# Patient Record
Sex: FEMALE | Race: WHITE | Employment: FULL TIME | URBAN - METROPOLITAN AREA
[De-identification: names, ages, dates, MRNs, and addresses within clinical notes are randomized per-mention and may not be internally consistent; named-entity substitution may affect disease eponyms.]

---

## 2018-08-17 ENCOUNTER — EMPLOYEE HEALTH (OUTPATIENT)
Dept: OTHER | Facility: HOSPITAL | Age: 33
End: 2018-08-17
Attending: PREVENTIVE MEDICINE
Payer: COMMERCIAL

## 2018-08-17 ENCOUNTER — HOSPITAL ENCOUNTER (OUTPATIENT)
Dept: GENERAL RADIOLOGY | Facility: HOSPITAL | Age: 33
Discharge: HOME | End: 2018-08-17
Attending: PREVENTIVE MEDICINE
Payer: COMMERCIAL

## 2018-08-17 DIAGNOSIS — R76.11 POSITIVE TB TEST: ICD-10-CM

## 2018-08-17 DIAGNOSIS — Z11.1 SCREENING-PULMONARY TB: Primary | ICD-10-CM

## 2018-08-29 ENCOUNTER — LAB ENCOUNTER (OUTPATIENT)
Dept: LAB | Age: 33
End: 2018-08-29
Attending: FAMILY MEDICINE
Payer: COMMERCIAL

## 2018-08-29 DIAGNOSIS — R76.11 POSITIVE PPD: Primary | ICD-10-CM

## 2018-08-29 PROCEDURE — 86480 TB TEST CELL IMMUN MEASURE: CPT

## 2018-08-29 PROCEDURE — 36415 COLL VENOUS BLD VENIPUNCTURE: CPT

## 2018-09-04 LAB
M TB TUBERC IFN-G BLD QL: POSITIVE
M TB TUBERC IFN-G/MITOGEN IGNF BLD: 0.4 IU/ML
M TB TUBERC IGNF/MITOGEN IGNF CONTROL: 7.22 IU/ML
MITOGEN IGNF BCKGRD COR BLD-ACNC: 0.06 IU/ML

## 2019-01-03 ENCOUNTER — OFFICE VISIT (OUTPATIENT)
Dept: OTHER | Facility: HOSPITAL | Age: 34
End: 2019-01-03
Attending: PREVENTIVE MEDICINE
Payer: OTHER MISCELLANEOUS

## 2019-01-03 RX ORDER — AZITHROMYCIN 250 MG/1
TABLET, FILM COATED ORAL
Qty: 6 TABLET | Refills: 0 | Status: SHIPPED | OUTPATIENT
Start: 2019-01-03

## 2019-01-03 NOTE — PATIENT INSTRUCTIONS
ROB tian, take as directed    Follow up if needed      Azithromycin tablets  Brand Names: Zithromax, Zithromax Tri-Tian, Zithromax Z-Tian  What is this medicine? AZITHROMYCIN (az ith rosibel MYE sin) is a macrolide antibiotic.  It is used to treat or prevent certai Do not take this medicine with any of the following medications:  · lincomycin  This medicine may also interact with the following medications:  · amiodarone  · antacids  · birth control pills  · cyclosporine  · digoxin  · magnesium  · nelfinavir  · phenyt

## 2019-02-21 ENCOUNTER — LAB ENCOUNTER (OUTPATIENT)
Dept: LAB | Facility: HOSPITAL | Age: 34
End: 2019-02-21
Attending: FAMILY MEDICINE
Payer: COMMERCIAL

## 2019-02-21 DIAGNOSIS — R76.11 POSITIVE PPD: Primary | ICD-10-CM

## 2019-02-21 DIAGNOSIS — R82.998 DARK URINE: ICD-10-CM

## 2019-02-21 LAB
ALBUMIN SERPL-MCNC: 3 G/DL (ref 3.4–5)
ALBUMIN/GLOB SERPL: 0.9 {RATIO} (ref 1–2)
ALP LIVER SERPL-CCNC: 141 U/L (ref 37–98)
ALT SERPL-CCNC: 146 U/L (ref 13–56)
ANION GAP SERPL CALC-SCNC: 7 MMOL/L (ref 0–18)
AST SERPL-CCNC: 154 U/L (ref 15–37)
BILIRUB SERPL-MCNC: 1 MG/DL (ref 0.1–2)
BUN BLD-MCNC: 11 MG/DL (ref 7–18)
BUN/CREAT SERPL: 14.7 (ref 10–20)
CALCIUM BLD-MCNC: 8.8 MG/DL (ref 8.5–10.1)
CHLORIDE SERPL-SCNC: 108 MMOL/L (ref 98–107)
CK SERPL-CCNC: 76 U/L (ref 26–192)
CO2 SERPL-SCNC: 24 MMOL/L (ref 21–32)
CREAT BLD-MCNC: 0.75 MG/DL (ref 0.55–1.02)
GLOBULIN PLAS-MCNC: 3.2 G/DL (ref 2.8–4.4)
GLUCOSE BLD-MCNC: 113 MG/DL (ref 70–99)
M PROTEIN MFR SERPL ELPH: 6.2 G/DL (ref 6.4–8.2)
OSMOLALITY SERPL CALC.SUM OF ELEC: 288 MOSM/KG (ref 275–295)
POTASSIUM SERPL-SCNC: 4.4 MMOL/L (ref 3.5–5.1)
SODIUM SERPL-SCNC: 139 MMOL/L (ref 136–145)

## 2019-02-21 PROCEDURE — 82550 ASSAY OF CK (CPK): CPT

## 2019-02-21 PROCEDURE — 36415 COLL VENOUS BLD VENIPUNCTURE: CPT

## 2019-02-21 PROCEDURE — 80053 COMPREHEN METABOLIC PANEL: CPT

## 2019-02-21 PROCEDURE — 86480 TB TEST CELL IMMUN MEASURE: CPT

## 2019-02-25 LAB
M TB TUBERC IFN-G BLD QL: NEGATIVE
M TB TUBERC IFN-G/MITOGEN IGNF BLD: 0.23 IU/ML
M TB TUBERC IFN-G/MITOGEN IGNF BLD: 0.31 IU/ML
M TB TUBERC IGNF/MITOGEN IGNF CONTROL: 9.83 IU/ML
MITOGEN IGNF BCKGRD COR BLD-ACNC: 0.04 IU/ML

## 2020-12-21 ENCOUNTER — TELEPHONE (OUTPATIENT)
Dept: INTERNAL MEDICINE CLINIC | Facility: HOSPITAL | Age: 35
End: 2020-12-21

## 2021-01-19 ENCOUNTER — TELEPHONE (OUTPATIENT)
Dept: INTERNAL MEDICINE CLINIC | Facility: HOSPITAL | Age: 36
End: 2021-01-19

## 2021-04-27 ENCOUNTER — IMMUNIZATION (OUTPATIENT)
Dept: LAB | Facility: HOSPITAL | Age: 36
End: 2021-04-27
Attending: EMERGENCY MEDICINE
Payer: COMMERCIAL

## 2021-04-27 DIAGNOSIS — Z23 NEED FOR VACCINATION: Primary | ICD-10-CM

## 2021-04-27 PROCEDURE — 0011A SARSCOV2 VAC 100MCG/0.5ML IM: CPT

## 2021-05-25 ENCOUNTER — IMMUNIZATION (OUTPATIENT)
Dept: LAB | Facility: HOSPITAL | Age: 36
End: 2021-05-25
Attending: EMERGENCY MEDICINE
Payer: COMMERCIAL

## 2021-05-25 DIAGNOSIS — Z23 NEED FOR VACCINATION: Primary | ICD-10-CM

## 2021-05-25 PROCEDURE — 0012A SARSCOV2 VAC 100MCG/0.5ML IM: CPT
